# Patient Record
Sex: FEMALE | Race: WHITE | NOT HISPANIC OR LATINO | URBAN - METROPOLITAN AREA
[De-identification: names, ages, dates, MRNs, and addresses within clinical notes are randomized per-mention and may not be internally consistent; named-entity substitution may affect disease eponyms.]

---

## 2017-02-09 ENCOUNTER — APPOINTMENT (RX ONLY)
Dept: URBAN - METROPOLITAN AREA CLINIC 371 | Facility: CLINIC | Age: 67
Setting detail: DERMATOLOGY
End: 2017-02-09

## 2017-02-09 DIAGNOSIS — I87.2 VENOUS INSUFFICIENCY (CHRONIC) (PERIPHERAL): ICD-10-CM

## 2017-02-09 DIAGNOSIS — M79.6 PAIN IN LIMB, HAND, FOOT, FINGERS AND TOES: ICD-10-CM

## 2017-02-09 PROBLEM — M79.651 PAIN IN RIGHT THIGH: Status: ACTIVE | Noted: 2017-02-09

## 2017-02-09 PROBLEM — M79.661 PAIN IN RIGHT LOWER LEG: Status: ACTIVE | Noted: 2017-02-09

## 2017-02-09 PROCEDURE — ? DOPPLER US

## 2017-02-09 PROCEDURE — 99203 OFFICE O/P NEW LOW 30 MIN: CPT | Mod: 25

## 2017-02-09 PROCEDURE — ? COUNSELING

## 2017-02-09 PROCEDURE — 93970 EXTREMITY STUDY: CPT

## 2017-02-09 ASSESSMENT — LOCATION SIMPLE DESCRIPTION DERM
LOCATION SIMPLE: LEFT POPLITEAL SKIN
LOCATION SIMPLE: RIGHT PRETIBIAL REGION
LOCATION SIMPLE: RIGHT THIGH
LOCATION SIMPLE: RIGHT POPLITEAL SKIN
LOCATION SIMPLE: LEFT PRETIBIAL REGION
LOCATION SIMPLE: RIGHT CALF
LOCATION SIMPLE: RIGHT KNEE

## 2017-02-09 ASSESSMENT — LOCATION DETAILED DESCRIPTION DERM
LOCATION DETAILED: RIGHT PROXIMAL PRETIBIAL REGION
LOCATION DETAILED: RIGHT ANTERIOR PROXIMAL THIGH
LOCATION DETAILED: RIGHT LATERAL KNEE
LOCATION DETAILED: LEFT POPLITEAL SKIN
LOCATION DETAILED: RIGHT POPLITEAL SKIN
LOCATION DETAILED: RIGHT PROXIMAL CALF
LOCATION DETAILED: LEFT PROXIMAL PRETIBIAL REGION

## 2017-02-09 ASSESSMENT — LOCATION ZONE DERM: LOCATION ZONE: LEG

## 2017-02-09 NOTE — PROCEDURE: DOPPLER US
Left Sfj Reflux (Sec): 4 seconds reflux
Right Sfj Size: 0.5 cm
Left Proximal Ssv/Lsv Size: 0.4 cm
Left Sfj Size: 0.6 cm
Use Ssv Or Lsv: SSV
Right Ssv/Lsv Reflux (Sec): no reflux
Detail Level: Zone

## 2017-03-28 ENCOUNTER — RX ONLY (OUTPATIENT)
Age: 67
Setting detail: RX ONLY
End: 2017-03-28

## 2017-03-28 ENCOUNTER — APPOINTMENT (RX ONLY)
Dept: URBAN - METROPOLITAN AREA CLINIC 371 | Facility: CLINIC | Age: 67
Setting detail: DERMATOLOGY
End: 2017-03-28

## 2017-03-28 DIAGNOSIS — I87.2 VENOUS INSUFFICIENCY (CHRONIC) (PERIPHERAL): ICD-10-CM

## 2017-03-28 PROCEDURE — ? COUNSELING

## 2017-03-28 PROCEDURE — ? TREATMENT REGIMEN

## 2017-03-28 PROCEDURE — 99213 OFFICE O/P EST LOW 20 MIN: CPT

## 2017-03-28 RX ORDER — DIAZEPAM 10 MG/1
TABLET ORAL
Qty: 1 | Refills: 0 | COMMUNITY
Start: 2017-03-28

## 2017-03-28 RX ORDER — CEPHALEXIN 500 MG/1
CAPSULE ORAL
Qty: 18 | Refills: 0 | COMMUNITY
Start: 2017-03-28

## 2017-03-28 RX ORDER — CHLORHEXIDINE GLUCONATE 213 G/1000ML
SOLUTION TOPICAL
Qty: 1 | Refills: 1 | COMMUNITY
Start: 2017-03-28

## 2017-03-28 RX ORDER — HYDROCODONE BITARTRATE AND ACETAMINOPHEN 5; 300 MG/1; MG/1
TABLET ORAL
Qty: 10 | Refills: 0 | COMMUNITY
Start: 2017-03-28

## 2017-03-28 ASSESSMENT — LOCATION SIMPLE DESCRIPTION DERM: LOCATION SIMPLE: LEFT THIGH

## 2017-03-28 ASSESSMENT — LOCATION ZONE DERM: LOCATION ZONE: LEG

## 2017-03-28 ASSESSMENT — LOCATION DETAILED DESCRIPTION DERM: LOCATION DETAILED: LEFT ANTERIOR DISTAL THIGH

## 2017-03-28 NOTE — PROCEDURE: TREATMENT REGIMEN
Plan: Pre-Op for CTEV of the Left GSV\\nPre-Op and Post-Op instructions given to patient and questions answered \\n\\nRxs given for \\n1.  Hibiclens\\n2.  Valium\\n3.  Vicodin\\n4.  Keflex\\n5.  Compression Stockings
Detail Level: Zone

## 2017-04-11 ENCOUNTER — APPOINTMENT (RX ONLY)
Dept: URBAN - METROPOLITAN AREA CLINIC 368 | Facility: CLINIC | Age: 67
Setting detail: DERMATOLOGY
End: 2017-04-11

## 2017-04-11 DIAGNOSIS — M79.6 PAIN IN LIMB, HAND, FOOT, FINGERS AND TOES: ICD-10-CM

## 2017-04-11 DIAGNOSIS — I87.2 VENOUS INSUFFICIENCY (CHRONIC) (PERIPHERAL): ICD-10-CM

## 2017-04-11 DIAGNOSIS — I83.9 ASYMPTOMATIC VARICOSE VEINS OF LOWER EXTREMITIES: ICD-10-CM

## 2017-04-11 PROBLEM — M79.662 PAIN IN LEFT LOWER LEG: Status: ACTIVE | Noted: 2017-04-11

## 2017-04-11 PROBLEM — I83.92 ASYMPTOMATIC VARICOSE VEINS OF LEFT LOWER EXTREMITY: Status: ACTIVE | Noted: 2017-04-11

## 2017-04-11 PROCEDURE — 36478 ENDOVENOUS LASER 1ST VEIN: CPT

## 2017-04-11 PROCEDURE — ? ENDOVENOUS ABLATION

## 2017-04-11 PROCEDURE — ? SCLEROTHERAPY

## 2017-04-11 PROCEDURE — 36479 ENDOVENOUS LASER VEIN ADDON: CPT

## 2017-04-11 PROCEDURE — 76942 ECHO GUIDE FOR BIOPSY: CPT

## 2017-04-11 PROCEDURE — ? COUNSELING

## 2017-04-11 PROCEDURE — 36471 NJX SCLRSNT MLT INCMPTNT VN: CPT

## 2017-04-11 ASSESSMENT — LOCATION SIMPLE DESCRIPTION DERM
LOCATION SIMPLE: LEFT PRETIBIAL REGION
LOCATION SIMPLE: LEFT THIGH

## 2017-04-11 ASSESSMENT — LOCATION DETAILED DESCRIPTION DERM
LOCATION DETAILED: LEFT ANTERIOR DISTAL THIGH
LOCATION DETAILED: LEFT PROXIMAL PRETIBIAL REGION
LOCATION DETAILED: LEFT DISTAL PRETIBIAL REGION

## 2017-04-11 ASSESSMENT — LOCATION ZONE DERM: LOCATION ZONE: LEG

## 2017-04-11 NOTE — PROCEDURE: ENDOVENOUS ABLATION
Body Location Override (Optional - Billing Will Still Be Based On Selected Body Map Location): left GSV

## 2017-04-11 NOTE — PROCEDURE: ENDOVENOUS ABLATION
Post-Care Instructions: \nYou have just had endovenous thermal ablation (ETA) for the treatment of superficial venous reflux in your leg. To optimize your recovery, please follow the instructions below.      \nMaterials to gather for your wound care (all available over the counter):      \n1. Compression stockings x 2 pairs      \n2. Coban or Comprilan wraps x 2  (ACE Wraps are not a good substitute)      \n3. Hibiclens solution (dilute in half and store in a stock bottle)      \n4. Telfa pads (nonstick gauze)      \n5. 4 x 4 gauze      \n6. Aquaphor? or Vaseline ointment       \n7. Medical adhesive tape      \n___1. If you have had sedation, you must have a  with you for the first 24 hours after surgery and must not operate any motor vehicles or equipment that requires alertness and coordination.       \n___2. Activity       \n - It is important NOT to be sitting or lying down for several hours after surgery. You may begin walking immediately after surgery. This is good for you, but take it easy.       \n - For 2 days after treatment: Avoid aerobic exercise, weight training, and all other types of exertion that increase your breathing and pulse rates.       \n - For one week after treatment:  Avoid immersion in hot tubs      \n___3. Compression and Wound care; Leg compression is vital to your success and safety for 2 weeks.      \na. After your procedure, we will place gauze over your treated site(s) followed by a wrap (Comprilan or Coban), which is left in place for 24 hours. Your elastic stocking is then worn over the wrap.  At bedtime, you may remove the stocking to sleep but keep the inner wrap (Comprilan or Coban) on.         \nb. After 24 hours, remove the wrap and gauze. Make sure you are lying down.       \nc. If you experience bleeding from the surgery site, place gauze over the area and apply firm pressure for 15 minutes without lifting. You may repeat this once. If bleeding persists, contact your physician.       \nd. Change your dressing once daily.  Lie down and remove the stockings and inner Comprilan wrap but not the gauze.  Take a shower with the gauze on and get it wet.       \ne. Remove the wet gauze in the shower and clean the incisions with diluted Hibiclens solution. Finish showering and pat dry.      \nf. Place some ointment (Aquaphor?, Vaseline) over any incision sites and apply Telfa? or non-stick gauze pad. Secure the Telfa or non-stick gauze pad with medical tape.         \ng. While lying down, rewrap your treated leg with Coban or Comprilan  followed by your stocking. The inner wrap and outer stocking combination should be worn for the first 7 days.        \nh. After the first week, you may discontinue the inner wrap and only wear stockings for another 7 days. The longer you wear the stockings beyond the minimum 2 weeks, the faster you will heal.      \n___4. After the procedure, a return visit in 5-7 days is essential. An ultrasound examination will be done to confirm the successful treatment of your vein and to evaluate for any complications.      \n___5. Bathing       \n - Do not bathe for 24 hours. After 24 hours, you may shower in warm (not hot) water.       \n - Clean your surgery sites during showering (3e) and when you are finished bathing, pat your leg dry with a towel and repeat wound care instructions in 3f and 3g.       \n - For one week after treatment:  Avoid immersion in hot tubs      \n___6. Discomfort;  Any pain or discomfort should decrease with each day after surgery.       \n - You may take acetaminophen (Tylenol, Extra-Strength Tylenol or Datril) or prescribed medicines as directed. If your pain is not better, then please contact your physician      \n - Do not use aspirin, products containing aspirin, or ibuprofen (for example AdvilTM or MotrinTM) for five days after your surgery, unless approved by your physician.      \n___7. Dietary restrictions;  Do not drink alcoholic beverages for two days after your phlebectomy.       \nWhen to contact your physician Contact your physician if you experience any of the following:       \n - Treated areas become increasingly sore, tender, red or warm.       \n - Incision sites have pus like drainage      \n - Acetaminophen does not relieve your discomfort       \n - Fever higher than 100.4 degrees Fahrenheit (38 degrees Celsius)

## 2017-04-11 NOTE — PROCEDURE: SCLEROTHERAPY
Sclerosant Volume (Cc): 5
Number Of Injections (Will Not Render If 0): 0
Detail Level: Zone
Sclerosant (A) %: 0.30
Post-Care Instructions: Wear prescription compression stockings for up to 1 week.  No exercising for 1 week.
Sclerosant Volume (Cc): 10

## 2017-04-11 NOTE — PROCEDURE: ENDOVENOUS ABLATION
Disposition: Compression dressings were placed and stockings. Wound care instructions were given, verbal and written.

## 2017-04-11 NOTE — PROCEDURE: ENDOVENOUS ABLATION
Consent was obtained with risks, benefits, and alternatives discussed for the above procedures.  Photographs were taken. Preoperative medications were taken as above.

## 2017-04-27 ENCOUNTER — APPOINTMENT (RX ONLY)
Dept: URBAN - METROPOLITAN AREA CLINIC 371 | Facility: CLINIC | Age: 67
Setting detail: DERMATOLOGY
End: 2017-04-27

## 2017-04-27 DIAGNOSIS — I83.9 ASYMPTOMATIC VARICOSE VEINS OF LOWER EXTREMITIES: ICD-10-CM

## 2017-04-27 DIAGNOSIS — I87.2 VENOUS INSUFFICIENCY (CHRONIC) (PERIPHERAL): ICD-10-CM

## 2017-04-27 DIAGNOSIS — M79.6 PAIN IN LIMB, HAND, FOOT, FINGERS AND TOES: ICD-10-CM

## 2017-04-27 PROBLEM — M79.652 PAIN IN LEFT THIGH: Status: ACTIVE | Noted: 2017-04-27

## 2017-04-27 PROBLEM — I83.91 ASYMPTOMATIC VARICOSE VEINS OF RIGHT LOWER EXTREMITY: Status: ACTIVE | Noted: 2017-04-27

## 2017-04-27 PROCEDURE — ? SCLEROTHERAPY

## 2017-04-27 PROCEDURE — ? ENDOVENOUS ABLATION

## 2017-04-27 PROCEDURE — 93971 EXTREMITY STUDY: CPT

## 2017-04-27 PROCEDURE — ? CONSULTATION: VEIN REMOVAL

## 2017-04-27 PROCEDURE — 36471 NJX SCLRSNT MLT INCMPTNT VN: CPT

## 2017-04-27 PROCEDURE — 76942 ECHO GUIDE FOR BIOPSY: CPT

## 2017-04-27 ASSESSMENT — LOCATION ZONE DERM: LOCATION ZONE: LEG

## 2017-04-27 ASSESSMENT — LOCATION DETAILED DESCRIPTION DERM
LOCATION DETAILED: LEFT MEDIAL PROXIMAL PRETIBIAL REGION
LOCATION DETAILED: RIGHT ANTERIOR DISTAL THIGH
LOCATION DETAILED: LEFT ANTERIOR MEDIAL PROXIMAL THIGH
LOCATION DETAILED: RIGHT LATERAL PROXIMAL PRETIBIAL REGION
LOCATION DETAILED: LEFT MEDIAL KNEE
LOCATION DETAILED: LEFT ANTERIOR MEDIAL DISTAL THIGH
LOCATION DETAILED: RIGHT LATERAL KNEE

## 2017-04-27 ASSESSMENT — LOCATION SIMPLE DESCRIPTION DERM
LOCATION SIMPLE: LEFT THIGH
LOCATION SIMPLE: RIGHT PRETIBIAL REGION
LOCATION SIMPLE: RIGHT THIGH
LOCATION SIMPLE: RIGHT KNEE
LOCATION SIMPLE: LEFT PRETIBIAL REGION
LOCATION SIMPLE: LEFT KNEE

## 2017-04-27 NOTE — PROCEDURE: ENDOVENOUS ABLATION
Consent was obtained with risks, benefits, and alternatives discussed for the above procedures.  Photographs were taken. Preoperative medications were taken as above.
Disposition: Compression dressings were placed and stockings. Wound care instructions were given, verbal and written.
Post-Care Instructions: \nYou have just had endovenous thermal ablation (ETA) for the treatment of superficial venous reflux in your leg. To optimize your recovery, please follow the instructions below.      \nMaterials to gather for your wound care (all available over the counter):      \n1. Compression stockings x 2 pairs      \n2. Coban or Comprilan wraps x 2  (ACE Wraps are not a good substitute)      \n3. Hibiclens solution (dilute in half and store in a stock bottle)      \n4. Telfa pads (nonstick gauze)      \n5. 4 x 4 gauze      \n6. Aquaphor? or Vaseline ointment       \n7. Medical adhesive tape      \n___1. If you have had sedation, you must have a  with you for the first 24 hours after surgery and must not operate any motor vehicles or equipment that requires alertness and coordination.       \n___2. Activity       \n - It is important NOT to be sitting or lying down for several hours after surgery. You may begin walking immediately after surgery. This is good for you, but take it easy.       \n - For 2 days after treatment: Avoid aerobic exercise, weight training, and all other types of exertion that increase your breathing and pulse rates.       \n - For one week after treatment:  Avoid immersion in hot tubs      \n___3. Compression and Wound care; Leg compression is vital to your success and safety for 2 weeks.      \na. After your procedure, we will place gauze over your treated site(s) followed by a wrap (Comprilan or Coban), which is left in place for 24 hours. Your elastic stocking is then worn over the wrap.  At bedtime, you may remove the stocking to sleep but keep the inner wrap (Comprilan or Coban) on.         \nb. After 24 hours, remove the wrap and gauze. Make sure you are lying down.       \nc. If you experience bleeding from the surgery site, place gauze over the area and apply firm pressure for 15 minutes without lifting. You may repeat this once. If bleeding persists, contact your physician.       \nd. Change your dressing once daily.  Lie down and remove the stockings and inner Comprilan wrap but not the gauze.  Take a shower with the gauze on and get it wet.       \ne. Remove the wet gauze in the shower and clean the incisions with diluted Hibiclens solution. Finish showering and pat dry.      \nf. Place some ointment (Aquaphor?, Vaseline) over any incision sites and apply Telfa? or non-stick gauze pad. Secure the Telfa or non-stick gauze pad with medical tape.         \ng. While lying down, rewrap your treated leg with Coban or Comprilan  followed by your stocking. The inner wrap and outer stocking combination should be worn for the first 7 days.        \nh. After the first week, you may discontinue the inner wrap and only wear stockings for another 7 days. The longer you wear the stockings beyond the minimum 2 weeks, the faster you will heal.      \n___4. After the procedure, a return visit in 5-7 days is essential. An ultrasound examination will be done to confirm the successful treatment of your vein and to evaluate for any complications.      \n___5. Bathing       \n - Do not bathe for 24 hours. After 24 hours, you may shower in warm (not hot) water.       \n - Clean your surgery sites during showering (3e) and when you are finished bathing, pat your leg dry with a towel and repeat wound care instructions in 3f and 3g.       \n - For one week after treatment:  Avoid immersion in hot tubs      \n___6. Discomfort;  Any pain or discomfort should decrease with each day after surgery.       \n - You may take acetaminophen (Tylenol, Extra-Strength Tylenol or Datril) or prescribed medicines as directed. If your pain is not better, then please contact your physician      \n - Do not use aspirin, products containing aspirin, or ibuprofen (for example AdvilTM or MotrinTM) for five days after your surgery, unless approved by your physician.      \n___7. Dietary restrictions;  Do not drink alcoholic beverages for two days after your phlebectomy.       \nWhen to contact your physician Contact your physician if you experience any of the following:       \n - Treated areas become increasingly sore, tender, red or warm.       \n - Incision sites have pus like drainage      \n - Acetaminophen does not relieve your discomfort       \n - Fever higher than 100.4 degrees Fahrenheit (38 degrees Celsius)
Hemostasis: Pressure
Maxx Laser: Monserrat
Tumescent Anesthesia Volume In Cc: 400
Detail Level: Zone
Number Of Incisions Per Microphlebectomy: 0
Estimated Blood Loss (Cc): minimal

## 2017-04-27 NOTE — PROCEDURE: CONSULTATION: VEIN REMOVAL
Right Leg Active Ulcers: 0- None
Include Right Vcss In Note: Yes
Right Leg Circumference: medium
Left Leg Venous Hyperpigmentation: 0- None or focal low intensity (tan)
Left Leg Compression Therapy: 0- None or noncompliant
Include Ceap In The Note?: No
Right Dorsalis Pedis Pulse: 2 (Easily palpable)
Detail Level: Detailed

## 2017-04-27 NOTE — PROCEDURE: SCLEROTHERAPY
Post-Care Instructions: Wear prescription compression stockings for up to 1 week.  No exercising for 1 week.
Detail Level: Zone
Number Of Injections (Will Not Render If 0): 0
Sclerosant (A) %: 0.30
Sclerosant Volume (Cc): 2
Sclerosant Volume (Cc): 10

## 2017-11-02 ENCOUNTER — APPOINTMENT (RX ONLY)
Dept: URBAN - METROPOLITAN AREA CLINIC 371 | Facility: CLINIC | Age: 67
Setting detail: DERMATOLOGY
End: 2017-11-02

## 2017-11-02 DIAGNOSIS — I87.2 VENOUS INSUFFICIENCY (CHRONIC) (PERIPHERAL): ICD-10-CM

## 2017-11-02 DIAGNOSIS — I83.9 ASYMPTOMATIC VARICOSE VEINS OF LOWER EXTREMITIES: ICD-10-CM

## 2017-11-02 DIAGNOSIS — I83.1 VARICOSE VEINS OF LOWER EXTREMITIES WITH INFLAMMATION: ICD-10-CM

## 2017-11-02 PROBLEM — I83.11 VARICOSE VEINS OF RIGHT LOWER EXTREMITY WITH INFLAMMATION: Status: ACTIVE | Noted: 2017-11-02

## 2017-11-02 PROBLEM — I83.93 ASYMPTOMATIC VARICOSE VEINS OF BILATERAL LOWER EXTREMITIES: Status: ACTIVE | Noted: 2017-11-02

## 2017-11-02 PROCEDURE — 99214 OFFICE O/P EST MOD 30 MIN: CPT | Mod: 25

## 2017-11-02 PROCEDURE — ? COSMETIC CONSULTATION: VEIN REMOVAL

## 2017-11-02 PROCEDURE — ? COUNSELING

## 2017-11-02 PROCEDURE — 93971 EXTREMITY STUDY: CPT

## 2017-11-02 PROCEDURE — ? DOPPLER US

## 2017-11-02 PROCEDURE — ? COSMETIC CONSULTATION: SCLEROTHERAPY

## 2017-11-02 ASSESSMENT — LOCATION DETAILED DESCRIPTION DERM
LOCATION DETAILED: RIGHT ANTERIOR MEDIAL DISTAL THIGH
LOCATION DETAILED: LEFT PROXIMAL PRETIBIAL REGION
LOCATION DETAILED: LEFT PROXIMAL CALF
LOCATION DETAILED: RIGHT PROXIMAL PRETIBIAL REGION
LOCATION DETAILED: RIGHT MEDIAL PROXIMAL PRETIBIAL REGION
LOCATION DETAILED: RIGHT PROXIMAL CALF

## 2017-11-02 ASSESSMENT — LOCATION ZONE DERM: LOCATION ZONE: LEG

## 2017-11-02 ASSESSMENT — LOCATION SIMPLE DESCRIPTION DERM
LOCATION SIMPLE: RIGHT THIGH
LOCATION SIMPLE: LEFT CALF
LOCATION SIMPLE: LEFT PRETIBIAL REGION
LOCATION SIMPLE: RIGHT CALF
LOCATION SIMPLE: RIGHT PRETIBIAL REGION

## 2017-11-02 NOTE — PROCEDURE: DOPPLER US
Right Spj Reflux (Sec): no reflux
Right Proximal Ssv/Lsv Size: 0.4 cm
Right Proximal Gsv Reflux (Sec): less than 3 seconds reflux
Right Proximal Gsv Size: 0.9 cm
Detail Level: Zone
Use Ssv Or Lsv: SSV

## 2021-04-15 ENCOUNTER — APPOINTMENT (RX ONLY)
Dept: URBAN - METROPOLITAN AREA CLINIC 371 | Facility: CLINIC | Age: 71
Setting detail: DERMATOLOGY
End: 2021-04-15

## 2021-04-15 DIAGNOSIS — I83.1 VARICOSE VEINS OF LOWER EXTREMITIES WITH INFLAMMATION: ICD-10-CM

## 2021-04-15 DIAGNOSIS — I83.9 ASYMPTOMATIC VARICOSE VEINS OF LOWER EXTREMITIES: ICD-10-CM | Status: INADEQUATELY CONTROLLED

## 2021-04-15 DIAGNOSIS — R60.0 LOCALIZED EDEMA: ICD-10-CM

## 2021-04-15 PROBLEM — I83.10 VARICOSE VEINS OF UNSPECIFIED LOWER EXTREMITY WITH INFLAMMATION: Status: ACTIVE | Noted: 2021-04-15

## 2021-04-15 PROBLEM — I83.93 ASYMPTOMATIC VARICOSE VEINS OF BILATERAL LOWER EXTREMITIES: Status: ACTIVE | Noted: 2021-04-15

## 2021-04-15 PROCEDURE — 99204 OFFICE O/P NEW MOD 45 MIN: CPT

## 2021-04-15 PROCEDURE — ? DOPPLER US

## 2021-04-15 PROCEDURE — ? PRESCRIPTION

## 2021-04-15 PROCEDURE — ? TREATMENT REGIMEN

## 2021-04-15 PROCEDURE — 93970 EXTREMITY STUDY: CPT

## 2021-04-15 PROCEDURE — ? COUNSELING

## 2021-04-15 RX ORDER — MULTIVITAMIN/IRON/FOLIC ACID 18MG-0.4MG
TABLET ORAL
Qty: 2 | Refills: 1 | Status: CANCELLED

## 2021-04-15 ASSESSMENT — LOCATION DETAILED DESCRIPTION DERM
LOCATION DETAILED: LEFT PROXIMAL PRETIBIAL REGION
LOCATION DETAILED: RIGHT PROXIMAL PRETIBIAL REGION
LOCATION DETAILED: LEFT DISTAL PRETIBIAL REGION
LOCATION DETAILED: RIGHT DISTAL PRETIBIAL REGION

## 2021-04-15 ASSESSMENT — LOCATION SIMPLE DESCRIPTION DERM
LOCATION SIMPLE: LEFT PRETIBIAL REGION
LOCATION SIMPLE: RIGHT PRETIBIAL REGION

## 2021-04-15 ASSESSMENT — LOCATION ZONE DERM: LOCATION ZONE: LEG

## 2021-04-15 NOTE — PROCEDURE: TREATMENT REGIMEN
Detail Level: Zone
Plan: Pt will follow up in office for measurements for Tactile medical device for swelling of the lower legs

## 2021-04-15 NOTE — PROCEDURE: DOPPLER US
Ultrasound Used (Optional): Siemens Acuson X300
Right Distal Gsv Reflux (Sec): no reflux
Other Left Leg Doppler Findings: Left intersaphenous- 3 mm\\n2 + Edema\\nAASV= 7 mm, 2 sec refux
Right Knee Gsv Size: 4 mm
Detail Level: Zone
Left Spj Size: 5 mm
Right Proximal Gsv Reflux (Sec): less than 3 seconds reflux
Left Distal Gsv Size: 3 mm
Right Proximal Gsv Size: 8 mm
Other Right Leg Doppler Findings: 2 + Edema\\nVaricose veins= 8mm
Left Knee Gsv Size: 2 mm
Use Ssv Or Lsv: SSV

## 2021-04-29 ENCOUNTER — APPOINTMENT (RX ONLY)
Dept: URBAN - METROPOLITAN AREA CLINIC 371 | Facility: CLINIC | Age: 71
Setting detail: DERMATOLOGY
End: 2021-04-29

## 2021-04-29 DIAGNOSIS — I83.1 VARICOSE VEINS OF LOWER EXTREMITIES WITH INFLAMMATION: ICD-10-CM | Status: INADEQUATELY CONTROLLED

## 2021-04-29 DIAGNOSIS — I87.2 VENOUS INSUFFICIENCY (CHRONIC) (PERIPHERAL): ICD-10-CM

## 2021-04-29 PROBLEM — I83.11 VARICOSE VEINS OF RIGHT LOWER EXTREMITY WITH INFLAMMATION: Status: ACTIVE | Noted: 2021-04-29

## 2021-04-29 PROCEDURE — ? TREATMENT REGIMEN

## 2021-04-29 PROCEDURE — ? COUNSELING

## 2021-04-29 PROCEDURE — ? VARITHENA SCLEROTHERAPY

## 2021-04-29 PROCEDURE — 36466 NJX NONCMPND SCLRSNT MLT VN: CPT | Mod: RT

## 2021-04-29 ASSESSMENT — LOCATION SIMPLE DESCRIPTION DERM
LOCATION SIMPLE: RIGHT PRETIBIAL REGION
LOCATION SIMPLE: RIGHT THIGH

## 2021-04-29 ASSESSMENT — LOCATION DETAILED DESCRIPTION DERM
LOCATION DETAILED: RIGHT ANTERIOR DISTAL THIGH
LOCATION DETAILED: RIGHT PROXIMAL PRETIBIAL REGION

## 2021-04-29 ASSESSMENT — LOCATION ZONE DERM: LOCATION ZONE: LEG

## 2021-04-29 NOTE — PROCEDURE: VARITHENA SCLEROTHERAPY
Post-Care Instructions: Compression for 2 weeks is critical to optimize your recovery and results. Compliance with compression helps to prevent blood clots and minimizes pain, swelling, bruising, skin discoloration (staining) and the recurrence of vessels.       \nMaterials to gather for your wound care (all available over the counter)      \nCompression stockings x 2 pairs, 4 x 4 gauze, Comprilan wrap: 8 cm and 10 cm width wrap, Medical adhesive tape       \nCompression and Wound care;  Leg compression for 3 weeks is granados to your success and safety. Compression at night is only needed the first day. After that, compression is needed only during waking hours.  However, if your leg feels better with compression at night, then you may continue compression at night as tolerated.       \nAfter the sclerotherapy procedure, 2 layers compression will be placed.       \n1. On the skin, folded or flat gauze will cover the treated areas.       \n2. A compression wrap (Comprilan) will be wrapped around your leg over the gauze. Once the compression wrap is in place, a compression stocking will be worn. This two layer  compression (wrap plus stocking) should be worn for the first 24 hours if tolerated.       \n3. After 24 hours, remove all compressions and dressings and just wear the compression stockings only during waking hours.        \nYou will need to wear compression stockings for three weeks after your procedure, unless your physician instructs you otherwise.       \nActivity       \n - It is important NOT to be sitting or lying down for several hours after surgery. You may begin walking immediately after surgery. This is good for you, but take it easy.       \n - For 2 days after treatment: Avoid aerobic exercise, weight training, and all other types of exertion that increase your breathing and pulse rates.       \n - Do not get a tan for one month after sclerotherapy. Tanning increases your risk of skin discoloration.      \nBathing       \nAfter 24 hours, you may shower or bathe in tepid water, but keep the compression stocking on. Avoid immersion in hot tubs.      \nDiscomfort . For pain or discomfort:       \n - You may take acetaminophen (TylenolTM, Extra-Strength TylenolTM or DatrilTM) as directed.       \n - Do not use aspirin, products containing aspirin, or ibuprofen (for example AdvilTM or MotrinTM) for five days after your surgery, unless approved by your physician.       \n - Dietary restrictions Do not drink alcoholic beverages for two days after your sclerotherapy procedure.       \nPossible side effects following sclerotherapy  After sclerotherapy, mild swelling is expected. The injection sites may also become bruised or gray. You may also experience one or more of the following side effects, which almost always go away within one to four months:       \n - Darkening of the injected veins       \n - Brownish staining of the skin       \n - Small clotted vessels under the surface of the skin that you can feel      \n - Bruising of the injection sites       \nWhat to do about bruising  This will resolve within 2-3 weeks. If you wish the bruising to disappear sooner, then applying Arnicare cream (over the counter, health food stores) will help.       \nWhat to do if you feel small clotted vessels under the surface of the skin.      \n - Call us for a follow up appointment. These small clots can be drained through a small nick.       \n - Draining these small clots will help you heal faster and with less discoloration.      \nWhen to contact your physician       \nContact your physician if you experience any of the following:       \n - Treated areas become increasingly sore, tender, red or warm       \n - Acetaminophen does not relieve your discomfort       \n - Injection sites turn black or the skin around the site breaks down       \n - Ulceration of the injection sites       \n - You develop blisters from the tape       \n - You develop significant swelling or pain in the leg       \n - Darkening of large areas of the skin or foot

## 2021-04-29 NOTE — PROCEDURE: TREATMENT REGIMEN
Plan: Venous Insufficiency: Varicose veins and venous insufficiency\\n\\nPRE-PROCEDURE DIAGNOSIS: Class 3 venous insufficiency with pain, tenderness, edema, and incompetence.\\n\\nPOST-PROCEDURE DIAGNOSIS: Class 3 venous insufficiency with pain, tenderness, edema, and incompetence.\\n\\nANESTHESIA: None\\n\\nPROCEDURE: Endovenous chemical ablation of the lower extremity from the _____ to the ______ with ultrasound guidance; ultrasound guided endovenous chemical ablation of multiple visible varicosities in the ___ .\\n\\nACTIVE AGENT: Varithena, 1%, Total ___ mL utilized\\n\\nFINDINGS:\\nThe great saphenous vein is visualized from the distal calf to the saphenofemoral junction and is compressible distally. The common femoral vein is compressible with no finding defect. An incompetent perforating vein is identified at the calf. Multiple visible varicosities at _______\\n\\nDETAILS:\\nThe patient was evaluated in reverse Trendelenburg position with the leg externally-rotated in the [facility]ascular [facility]. The tributaries and varicosities associated with the great saphenous vein, and perforating veins associated with the saphenous vein were evaluated with ultrasound. Mapping was performed including marking of major tributaries into the great saphenous vein as well as location of perforating veins, both competent and incompetent. The extent of treatment of the great saphenous vein and of the associated varicosities was determined through this personal mapping by the treating physician.\\n\\nThe extremity was prepped with isopropyl Alcohol 70% and sterile drapes were applied.  The planned puncture site was anesthetized with a small intradermal dose of 1% lidocaine.  The vein to be treated was punctured with a 25 gauge needle.  The lower extremity was then positioned elevating the leg to 45 degrees while maintaining sterility of the drapes overlaying the elevated leg. The Varithena canister was activated and the initial foam generated was discarded. A ___ mL aliquot of Varithena foam in a sterile syringe was then attached to the access needle. Injection of the Varithena was done at ½ to 1 cc per second with observation by ultrasound into the selected vein. Injection of Varithena was stopped at this point and the treated vein was observed with ultrasound over a period of 3-5 minutes watching for spasm to develop within the vein. Because an incompetent perforating vein was also observed in the calf near the great saphenous vein, this perforating vein was also observed and compressed during the injection to limit the possibility of foam passing into the deep venous system at this perforating vein. The treated vein was observed for 3-5 minutes to identify spasm within the treated segments as previously.\\n\\nWe then examined the residual visible varicosities in the ___ and chose to place a 25 gauge needle through the skin into the varicosity utilizing ultrasound guidance. We then administered ______ mL of 1% Varithena solution into the varicose vein to fill the varicosity and multiple adjacent varicosities with the drug. The rate and volume of the flow of the drug were determined by the ultrasound appearance of the varicosities filling with the drug and michele. Once appropriate spasm had been confirmed in the treated veins, the previously mapped and treated veins were once again scanned extensively with ultrasound to confirm vascular spasm, the access needle was removed from the leg and light pressure was applied over the puncture sites for hemostasis.\\n\\nThe femoral veins were then evaluated for flow and compressibility prior to dressing placement. The lower extremity was kept elevated at 45 to 60 degrees above the horizontal and an ace wrap was placed on the leg from the distal foot up to the groin. At this point the leg was placed back into the horizontal position.\\n\\nThe patient tolerated the procedure well without any adverse events. The patient was given instructions and was ambulated for 10 minutes under supervision.\\n\\nFinal questions were answered and the patient was then discharged home with instructions regarding post-procedure care, expectations, and return office visit appointment. Follow up visit will include color duplex ultrasound imaging of the great saphenous vein, the treated superficial varicosities, and the adjacent deep veins.\\n\\nVarithena\\nLot#:\\nExp Date:
Detail Level: Zone

## 2021-05-13 ENCOUNTER — APPOINTMENT (RX ONLY)
Dept: URBAN - METROPOLITAN AREA CLINIC 371 | Facility: CLINIC | Age: 71
Setting detail: DERMATOLOGY
End: 2021-05-13

## 2021-05-13 DIAGNOSIS — I87.2 VENOUS INSUFFICIENCY (CHRONIC) (PERIPHERAL): ICD-10-CM

## 2021-05-13 DIAGNOSIS — I83.9 ASYMPTOMATIC VARICOSE VEINS OF LOWER EXTREMITIES: ICD-10-CM

## 2021-05-13 PROBLEM — I83.91 ASYMPTOMATIC VARICOSE VEINS OF RIGHT LOWER EXTREMITY: Status: ACTIVE | Noted: 2021-05-13

## 2021-05-13 PROCEDURE — 36466 NJX NONCMPND SCLRSNT MLT VN: CPT

## 2021-05-13 PROCEDURE — ? COUNSELING

## 2021-05-13 PROCEDURE — ? TREATMENT REGIMEN

## 2021-05-13 ASSESSMENT — LOCATION DETAILED DESCRIPTION DERM
LOCATION DETAILED: RIGHT LATERAL KNEE
LOCATION DETAILED: RIGHT ANTERIOR PROXIMAL THIGH
LOCATION DETAILED: RIGHT LATERAL PROXIMAL PRETIBIAL REGION
LOCATION DETAILED: RIGHT ANTERIOR DISTAL THIGH

## 2021-05-13 ASSESSMENT — LOCATION SIMPLE DESCRIPTION DERM
LOCATION SIMPLE: RIGHT KNEE
LOCATION SIMPLE: RIGHT PRETIBIAL REGION
LOCATION SIMPLE: RIGHT THIGH

## 2021-05-13 ASSESSMENT — LOCATION ZONE DERM: LOCATION ZONE: LEG

## 2021-05-13 NOTE — PROCEDURE: TREATMENT REGIMEN
Detail Level: Zone
Plan: Venous Insufficiency: Varicose veins and venous insufficiency\\n\\nPRE-PROCEDURE DIAGNOSIS: Class 3 venous insufficiency with pain, tenderness, edema, and incompetence.\\n\\nPOST-PROCEDURE DIAGNOSIS: Class 3 venous insufficiency with pain, tenderness, edema, and incompetence.\\n\\nANESTHESIA: None\\n\\nPROCEDURE: Endovenous chemical ablation of the lower extremity from the _____ to the ______ with ultrasound guidance; ultrasound guided endovenous chemical ablation of multiple visible varicosities in the ___ .\\n\\nACTIVE AGENT: Varithena, 1%, Total ___ mL utilized\\n\\nFINDINGS:\\nThe great saphenous vein is visualized from the distal calf to the saphenofemoral junction and is compressible distally. The common femoral vein is compressible with no finding defect. An incompetent perforating vein is identified at the calf. Multiple visible varicosities at _______\\n\\nDETAILS:\\nThe patient was evaluated in reverse Trendelenburg position with the leg externally-rotated in the [facility]. The tributaries and varicosities associated with the great saphenous vein, and perforating veins associated with the saphenous vein were evaluated with ultrasound. Mapping was performed including marking of major tributaries into the great saphenous vein as well as location of perforating veins, both competent and incompetent. The extent of treatment of the great saphenous vein and of the associated varicosities was determined through this personal mapping by the treating physician.\\n\\nThe extremity was prepped with isopropyl Alcohol 70% and sterile drapes were applied.  The planned puncture site was anesthetized with a small intradermal dose of 1% lidocaine.  The vein to be treated was punctured with a 25 gauge needle.  The lower extremity was then positioned elevating the leg to 45 degrees while maintaining sterility of the drapes overlaying the elevated leg. The Varithena canister was activated and the initial foam generated was discarded. A ___ mL aliquot of Varithena foam in a sterile syringe was then attached to the access needle. Injection of the Varithena was done at ½ to 1 cc per second with observation by ultrasound into the selected vein. Injection of Varithena was stopped at this point and the treated vein was observed with ultrasound over a period of 3-5 minutes watching for spasm to develop within the vein. Because an incompetent perforating vein was also observed in the calf near the great saphenous vein, this perforating vein was also observed and compressed during the injection to limit the possibility of foam passing into the deep venous system at this perforating vein. The treated vein was observed for 3-5 minutes to identify spasm within the treated segments as previously.\\n\\nWe then examined the residual visible varicosities in the ___ and chose to place a 25 gauge needle through the skin into the varicosity utilizing ultrasound guidance. We then administered ______ mL of 1% Varithena solution into the varicose vein to fill the varicosity and multiple adjacent varicosities with the drug. The rate and volume of the flow of the drug were determined by the ultrasound appearance of the varicosities filling with the drug and michele. Once appropriate spasm had been confirmed in the treated veins, the previously mapped and treated veins were once again scanned extensively with ultrasound to confirm vascular spasm, the access needle was removed from the leg and light pressure was applied over the puncture sites for hemostasis.\\n\\nThe femoral veins were then evaluated for flow and compressibility prior to dressing placement. The lower extremity was kept elevated at 45 to 60 degrees above the horizontal and an ace wrap was placed on the leg from the distal foot up to the groin. At this point the leg was placed back into the horizontal position.\\n\\nThe patient tolerated the procedure well without any adverse events. The patient was given instructions and was ambulated for 10 minutes under supervision.\\n\\nFinal questions were answered and the patient was then discharged home with instructions regarding post-procedure care, expectations, and return office visit appointment. Follow up visit will include color duplex ultrasound imaging of the great saphenous vein, the treated superficial varicosities, and the adjacent deep veins.\\n\\nVarithena\\nLot#:\\nExp Date:

## 2021-06-10 ENCOUNTER — APPOINTMENT (RX ONLY)
Dept: URBAN - METROPOLITAN AREA CLINIC 371 | Facility: CLINIC | Age: 71
Setting detail: DERMATOLOGY
End: 2021-06-10

## 2021-06-10 DIAGNOSIS — I83.9 ASYMPTOMATIC VARICOSE VEINS OF LOWER EXTREMITIES: ICD-10-CM

## 2021-06-10 DIAGNOSIS — I87.2 VENOUS INSUFFICIENCY (CHRONIC) (PERIPHERAL): ICD-10-CM | Status: IMPROVED

## 2021-06-10 PROBLEM — I83.91 ASYMPTOMATIC VARICOSE VEINS OF RIGHT LOWER EXTREMITY: Status: ACTIVE | Noted: 2021-06-10

## 2021-06-10 PROCEDURE — ? ENDOVENOUS ABLATION

## 2021-06-10 PROCEDURE — 99213 OFFICE O/P EST LOW 20 MIN: CPT

## 2021-06-10 PROCEDURE — 93971 EXTREMITY STUDY: CPT

## 2021-06-10 PROCEDURE — ? COUNSELING

## 2021-06-10 ASSESSMENT — LOCATION DETAILED DESCRIPTION DERM
LOCATION DETAILED: RIGHT LATERAL PROXIMAL PRETIBIAL REGION
LOCATION DETAILED: RIGHT ANTERIOR DISTAL THIGH
LOCATION DETAILED: RIGHT ANTERIOR PROXIMAL THIGH
LOCATION DETAILED: RIGHT LATERAL KNEE

## 2021-06-10 ASSESSMENT — LOCATION SIMPLE DESCRIPTION DERM
LOCATION SIMPLE: RIGHT THIGH
LOCATION SIMPLE: RIGHT PRETIBIAL REGION
LOCATION SIMPLE: RIGHT KNEE

## 2021-06-10 ASSESSMENT — LOCATION ZONE DERM: LOCATION ZONE: LEG

## 2021-06-10 NOTE — PROCEDURE: ENDOVENOUS ABLATION
Number Of Incisions Per Microphlebectomy: 0
Medical Necessity Clause: This therapy was medically necessary because the patient has
Post-Care Instructions: \nYou have just had endovenous thermal ablation (ETA) for the treatment of superficial venous reflux in your leg. To optimize your recovery, please follow the instructions below.      \nMaterials to gather for your wound care (all available over the counter):      \n1. Compression stockings x 2 pairs      \n2. Coban or Comprilan wraps x 2  (ACE Wraps are not a good substitute)      \n3. Hibiclens solution (dilute in half and store in a stock bottle)      \n4. Telfa pads (nonstick gauze)      \n5. 4 x 4 gauze      \n6. Aquaphor? or Vaseline ointment       \n7. Medical adhesive tape      \n___1. If you have had sedation, you must have a  with you for the first 24 hours after surgery and must not operate any motor vehicles or equipment that requires alertness and coordination.       \n___2. Activity       \n - It is important NOT to be sitting or lying down for several hours after surgery. You may begin walking immediately after surgery. This is good for you, but take it easy.       \n - For 2 days after treatment: Avoid aerobic exercise, weight training, and all other types of exertion that increase your breathing and pulse rates.       \n - For one week after treatment:  Avoid immersion in hot tubs      \n___3. Compression and Wound care; Leg compression is vital to your success and safety for 2 weeks.      \na. After your procedure, we will place gauze over your treated site(s) followed by a wrap (Comprilan or Coban), which is left in place for 24 hours. Your elastic stocking is then worn over the wrap.  At bedtime, you may remove the stocking to sleep but keep the inner wrap (Comprilan or Coban) on.         \nb. After 24 hours, remove the wrap and gauze. Make sure you are lying down.       \nc. If you experience bleeding from the surgery site, place gauze over the area and apply firm pressure for 15 minutes without lifting. You may repeat this once. If bleeding persists, contact your physician.       \nd. Change your dressing once daily.  Lie down and remove the stockings and inner Comprilan wrap but not the gauze.  Take a shower with the gauze on and get it wet.       \ne. Remove the wet gauze in the shower and clean the incisions with diluted Hibiclens solution. Finish showering and pat dry.      \nf. Place some ointment (Aquaphor?, Vaseline) over any incision sites and apply Telfa? or non-stick gauze pad. Secure the Telfa or non-stick gauze pad with medical tape.         \ng. While lying down, rewrap your treated leg with Coban or Comprilan  followed by your stocking. The inner wrap and outer stocking combination should be worn for the first 7 days.        \nh. After the first week, you may discontinue the inner wrap and only wear stockings for another 7 days. The longer you wear the stockings beyond the minimum 2 weeks, the faster you will heal.      \n___4. After the procedure, a return visit in 5-7 days is essential. An ultrasound examination will be done to confirm the successful treatment of your vein and to evaluate for any complications.      \n___5. Bathing       \n - Do not bathe for 24 hours. After 24 hours, you may shower in warm (not hot) water.       \n - Clean your surgery sites during showering (3e) and when you are finished bathing, pat your leg dry with a towel and repeat wound care instructions in 3f and 3g.       \n - For one week after treatment:  Avoid immersion in hot tubs      \n___6. Discomfort;  Any pain or discomfort should decrease with each day after surgery.       \n - You may take acetaminophen (Tylenol, Extra-Strength Tylenol or Datril) or prescribed medicines as directed. If your pain is not better, then please contact your physician      \n - Do not use aspirin, products containing aspirin, or ibuprofen (for example AdvilTM or MotrinTM) for five days after your surgery, unless approved by your physician.      \n___7. Dietary restrictions;  Do not drink alcoholic beverages for two days after your phlebectomy.       \nWhen to contact your physician Contact your physician if you experience any of the following:       \n - Treated areas become increasingly sore, tender, red or warm.       \n - Incision sites have pus like drainage      \n - Acetaminophen does not relieve your discomfort       \n - Fever higher than 100.4 degrees Fahrenheit (38 degrees Celsius)
Disposition: Compression dressings were placed and stockings. Wound care instructions were given, verbal and written.
Medical Necessity Information: LCD Guidelines vary from payer to payer. Please check with your payer's policy to determine medical necessity.
Consent was obtained with risks, benefits, and alternatives discussed for the above procedures.  Photographs were taken. Preoperative medications were taken as above.
Hemostasis: Pressure
Estimated Blood Loss (Cc): minimal
Detail Level: Zone
Tumescent Anesthesia Volume In Cc: 300

## 2022-12-16 ENCOUNTER — APPOINTMENT (RX ONLY)
Dept: URBAN - METROPOLITAN AREA CLINIC 338 | Facility: CLINIC | Age: 72
Setting detail: DERMATOLOGY
End: 2022-12-16

## 2022-12-16 DIAGNOSIS — L57.0 ACTINIC KERATOSIS: ICD-10-CM

## 2022-12-16 DIAGNOSIS — L73.8 OTHER SPECIFIED FOLLICULAR DISORDERS: ICD-10-CM

## 2022-12-16 DIAGNOSIS — L82.0 INFLAMED SEBORRHEIC KERATOSIS: ICD-10-CM

## 2022-12-16 PROCEDURE — 99212 OFFICE O/P EST SF 10 MIN: CPT | Mod: 25

## 2022-12-16 PROCEDURE — 17110 DESTRUCTION B9 LES UP TO 14: CPT

## 2022-12-16 PROCEDURE — ? LIQUID NITROGEN

## 2022-12-16 PROCEDURE — ? COUNSELING

## 2022-12-16 PROCEDURE — 17000 DESTRUCT PREMALG LESION: CPT | Mod: 59

## 2022-12-16 ASSESSMENT — LOCATION SIMPLE DESCRIPTION DERM
LOCATION SIMPLE: LEFT CHEEK
LOCATION SIMPLE: RIGHT CHEEK

## 2022-12-16 ASSESSMENT — LOCATION DETAILED DESCRIPTION DERM
LOCATION DETAILED: LEFT INFERIOR CENTRAL MALAR CHEEK
LOCATION DETAILED: LEFT SUPERIOR LATERAL BUCCAL CHEEK
LOCATION DETAILED: LEFT CENTRAL MALAR CHEEK
LOCATION DETAILED: LEFT MEDIAL MALAR CHEEK
LOCATION DETAILED: RIGHT MEDIAL MALAR CHEEK

## 2022-12-16 ASSESSMENT — LOCATION ZONE DERM: LOCATION ZONE: FACE

## 2022-12-16 NOTE — PROCEDURE: LIQUID NITROGEN
Detail Level: Zone
Duration Of Freeze Thaw-Cycle (Seconds): 2
Show Applicator Variable?: Yes
Render Note In Bullet Format When Appropriate: No
Post-Care Instructions: I reviewed with the patient in detail post-care instructions. Patient is to wear sunprotection, and avoid picking at any of the treated lesions. Pt may apply Vaseline to crusted or scabbing areas.
Consent: The patient's consent was obtained including but not limited to risks of crusting, scabbing, blistering, scarring, darker or lighter pigmentary change, recurrence, incomplete removal and infection.
Number Of Freeze-Thaw Cycles: 2 freeze-thaw cycles
Medical Necessity Clause: This procedure was medically necessary because the lesions that were treated were:
Spray Paint Text: The liquid nitrogen was applied to the skin utilizing a spray paint frosting technique.
Medical Necessity Information: It is in your best interest to select a reason for this procedure from the list below. All of these items fulfill various CMS LCD requirements except the new and changing color options.